# Patient Record
Sex: FEMALE | Race: OTHER | NOT HISPANIC OR LATINO | Employment: FULL TIME | URBAN - METROPOLITAN AREA
[De-identification: names, ages, dates, MRNs, and addresses within clinical notes are randomized per-mention and may not be internally consistent; named-entity substitution may affect disease eponyms.]

---

## 2022-01-13 ENCOUNTER — HISTORICAL (OUTPATIENT)
Dept: RADIOLOGY | Facility: HOSPITAL | Age: 32
End: 2022-01-13

## 2022-04-07 ENCOUNTER — HISTORICAL (OUTPATIENT)
Dept: ADMINISTRATIVE | Facility: HOSPITAL | Age: 32
End: 2022-04-07
Payer: MEDICAID

## 2022-04-23 VITALS
DIASTOLIC BLOOD PRESSURE: 85 MMHG | WEIGHT: 260.81 LBS | OXYGEN SATURATION: 100 % | HEIGHT: 61 IN | SYSTOLIC BLOOD PRESSURE: 121 MMHG | BODY MASS INDEX: 49.24 KG/M2

## 2022-05-11 ENCOUNTER — HOSPITAL ENCOUNTER (OUTPATIENT)
Dept: RADIOLOGY | Facility: HOSPITAL | Age: 32
Discharge: HOME | End: 2022-05-11
Attending: NURSE PRACTITIONER
Payer: MEDICAID

## 2022-05-11 DIAGNOSIS — M25.561 RIGHT KNEE PAIN: Primary | ICD-10-CM

## 2022-05-11 DIAGNOSIS — M25.561 RIGHT KNEE PAIN: ICD-10-CM

## 2022-05-11 PROCEDURE — 73560 X-RAY EXAM OF KNEE 1 OR 2: CPT | Mod: TC,RT

## 2022-06-08 ENCOUNTER — OFFICE VISIT (OUTPATIENT)
Dept: GYNECOLOGY | Facility: CLINIC | Age: 32
End: 2022-06-08
Payer: MEDICAID

## 2022-06-08 VITALS
OXYGEN SATURATION: 99 % | HEIGHT: 61 IN | HEART RATE: 80 BPM | DIASTOLIC BLOOD PRESSURE: 96 MMHG | TEMPERATURE: 99 F | RESPIRATION RATE: 18 BRPM | SYSTOLIC BLOOD PRESSURE: 144 MMHG | BODY MASS INDEX: 49.47 KG/M2 | WEIGHT: 262 LBS

## 2022-06-08 DIAGNOSIS — E66.01 MORBID OBESITY: ICD-10-CM

## 2022-06-08 DIAGNOSIS — E28.2 PCOS (POLYCYSTIC OVARIAN SYNDROME): ICD-10-CM

## 2022-06-08 DIAGNOSIS — Z30.09 ENCOUNTER FOR COUNSELING REGARDING CONTRACEPTION: ICD-10-CM

## 2022-06-08 DIAGNOSIS — I10 HYPERTENSION, UNSPECIFIED TYPE: ICD-10-CM

## 2022-06-08 DIAGNOSIS — N88.8 CERVICAL MASS: Primary | ICD-10-CM

## 2022-06-08 PROCEDURE — 99214 OFFICE O/P EST MOD 30 MIN: CPT | Mod: PBBFAC

## 2022-06-08 PROCEDURE — 57500 BIOPSY OF CERVIX: CPT | Mod: PBBFAC | Performed by: OBSTETRICS & GYNECOLOGY

## 2022-06-08 PROCEDURE — 88305 TISSUE EXAM BY PATHOLOGIST: CPT

## 2022-06-08 RX ORDER — MELOXICAM 15 MG/1
15 TABLET ORAL DAILY
COMMUNITY
Start: 2022-05-10

## 2022-06-08 RX ORDER — DICLOFENAC SODIUM 10 MG/G
GEL TOPICAL
COMMUNITY
Start: 2022-05-12

## 2022-06-08 RX ORDER — TRIPROLIDINE/PSEUDOEPHEDRINE 2.5MG-60MG
TABLET ORAL
COMMUNITY

## 2022-06-08 RX ORDER — CAMPHOR 0.45 %
GEL (GRAM) TOPICAL
COMMUNITY
End: 2022-06-09 | Stop reason: SDUPTHER

## 2022-06-08 RX ORDER — NORGESTIMATE AND ETHINYL ESTRADIOL 0.25-0.035
KIT ORAL
COMMUNITY
Start: 2021-12-29 | End: 2022-12-22

## 2022-06-08 RX ORDER — PROMETHAZINE HYDROCHLORIDE AND DEXTROMETHORPHAN HYDROBROMIDE 6.25; 15 MG/5ML; MG/5ML
5 SYRUP ORAL
COMMUNITY
Start: 2022-01-07

## 2022-06-08 RX ORDER — METHYLPREDNISOLONE 4 MG/1
TABLET ORAL
COMMUNITY
Start: 2022-05-23

## 2022-06-08 RX ORDER — TRIAMCINOLONE ACETONIDE 1 MG/G
CREAM TOPICAL
COMMUNITY
Start: 2022-01-24

## 2022-06-08 RX ORDER — HYDROCHLOROTHIAZIDE 12.5 MG/1
12.5 CAPSULE ORAL DAILY
COMMUNITY
Start: 2022-05-31

## 2022-06-08 NOTE — PROGRESS NOTES
[facility] GYNECOLOGY CLINIC NOTE     Devyn Oakes is a 31 y.o. G0 w/ PMH PCOS, morbid obesity, and  presenting to GYN clinic for follow-up of AUB and PCOS. Patient was seen by Dr. Browne and noted to have irregular cycles on OCPs (ortho-cyclen).  She notes that she has since had regular cycles following that appointment and she only had irregular spotting over the course of one month or so. She would like to continue on the Sprintec.     She also has had significant history of abnormal pap smears with an excisional procedure in the past. She is due for pap smear today.     TVUS results were reviewed today, overall unremarkable.    Gynecology  12/Irregular   Contraception: IUD     Past Medical History:   Diagnosis Date    Abnormal Pap smear of cervix     Hypertension       Past Surgical History:   Procedure Laterality Date    CERVICAL BIOPSY  W/ LOOP ELECTRODE EXCISION        Current Outpatient Medications   Medication Instructions    diclofenac sodium (VOLTAREN) 1 % Gel Topical (Top)    diphenhydrAMINE HCL (BENADRYL) 2 % Gel Benadryl Take No date recorded No form recorded No frequency recorded No route recorded No set duration recorded No set duration amount recorded suspended No dosage strength recorded No dosage strength units of measure recorded    hydroCHLOROthiazide (MICROZIDE) 12.5 mg, Oral, Daily    ibuprofen (ADVIL,MOTRIN) 100 mg/5 mL suspension ibuprofen Take No date recorded No form recorded No frequency recorded No route recorded No set duration recorded No set duration amount recorded suspended No dosage strength recorded No dosage strength units of measure recorded    meloxicam (MOBIC) 15 mg, Oral, Daily    methylPREDNISolone (MEDROL DOSEPACK) 4 mg tablet Medrol (Jean-Paul) Take as directed 20220523 tablets,dose pack No frequency recorded No route recorded No set duration recorded No set duration amount recorded active 4 mg    norgestimate-ethinyl estradioL (ORTHO-CYCLEN) 0.25-35 mg-mcg  "per tablet Oral    promethazine-dextromethorphan (PROMETHAZINE-DM) 6.25-15 mg/5 mL Syrp 5 mLs    triamcinolone acetonide 0.1% (KENALOG) 0.1 % cream apply to right arm TWICE A DAY FOR 10 DAYS     Social History     Tobacco Use    Smoking status: Never Smoker    Smokeless tobacco: Never Used   Substance Use Topics    Alcohol use: Yes     Comment: soically    Drug use: Never       Review of Systems  Pertinent items are noted in HPI.     Objective:     BP (!) 144/96 (BP Location: Left arm, Patient Position: Sitting, BP Method: Large (Automatic))   Pulse 80   Temp 98.7 °F (37.1 °C)   Resp 18   Ht 5' 1" (1.549 m)   Wt 118.8 kg (262 lb)   LMP 2022 (Exact Date)   SpO2 99%   BMI 49.50 kg/m²     Physical Exam:  Gen: Well-nourished, obese female. Alert, cooperative, in no acute distress. Anxious.  CV: regular rate  Chest: no increased WOB  Abdomen: Soft, non-tender, no masses, obese  Extrem: Extremities normal, atraumatic, non-tender calves.  External genitalia: Normal female genetalia without lesion, discharge or tenderness.  Speculum Exam: Vaginal vault without discharge, nonodorous, no lesions/masses seen.  Cervical os visualized as closed,. Small red mass appears to be protruding from the cervical os.   Bimanual Exam: No cervical motion tenderness.  Uterus freely mobile.  Limited due to body habitus. No adnexal masses.  Nontender exam.   Note: RN chaperone present for entirety of exam.    Assessment:       31 y.o.  here for follow-up of abnormal bleeding and pap smear.  1. Cervical mass  Specimen to Pathology Gynecology and Obstetrics    Liquid-Based Pap Smear, Screening Screening   2. Contraception counseling     3. PCOS (polycystic ovarian syndrome)     4. Hypertension     5. Morbid obesity              Procedures:   Date of procedure:  2022    Procedure(s):    Cervical mass biopsy    Indication: Cervical mass    Procedure technique:     Devyn Oakes was counseled on risks, " benefits, alternatives to her procedure today. A consent form was reviewed and signed. All questions were answered to her satisfaction.    After discussing and obtaining informed consent, a time out was performed confirming her identity, surgical site, planned procedure and that all necessary equipment was available. She was positioned on the exam table in dorsal lithotomy position.   Speculum was placed with excellent visualization of the cervix. Cervix swabbed with betadine x 3. Ringed forceps used to grasp and tease cervical mass and removed. Specimen labeled and sent to pathology.  All instruments removed. Patient tolerated the procedure well.  Minimal EBL.  No complications.    Plan:     Problem List Items Addressed This Visit        Cardiac/Vascular    Hypertension     -- Continue HCTZ              Renal/    Cervical mass - Primary     -- Pap smear today and cervical mass biopsy today           Relevant Orders    Specimen to Pathology Gynecology and Obstetrics (Completed)    Liquid-Based Pap Smear, Screening Screening    Contraception counseling       Endocrine    PCOS (polycystic ovarian syndrome)     -- She is happy with her contraception at this time. Will continue Sprintec.  -- Discussed that PCOS causes increased insulin resistance and increases unopposed estrogen that can lead to endometrial hyperplasia. It will be important to continue to exercise, eat healthy and attempt to lose weight. She also has an increased risk of annovulatory cycles, so continuing the Sprintec will be important in order to regular menstrual cycles.           Morbid obesity     Discussed the importance of weight loss with conservative measures like improved exercise and nutrition but also discussed bariatrics.                 Return to clinic 1 year for annual with NNP    Discussed patient and plan with Provost Diya Riggs MD PGY-3  Obstetrics and Gynecology

## 2022-06-09 RX ORDER — CAMPHOR 0.45 %
GEL (GRAM) TOPICAL
Qty: 8 ML | Refills: 0 | Status: SHIPPED | OUTPATIENT
Start: 2022-06-09

## 2022-06-10 LAB
ESTROGEN SERPL-MCNC: NORMAL PG/ML
INSULIN SERPL-ACNC: NORMAL U[IU]/ML
LAB AP CLINICAL INFORMATION: NORMAL
LAB AP GROSS DESCRIPTION: NORMAL
LAB AP REPORT FOOTNOTES: NORMAL
T3RU NFR SERPL: NORMAL %

## 2022-06-12 PROBLEM — L68.0 HIRSUTISM: Status: ACTIVE | Noted: 2022-06-12

## 2022-06-12 PROBLEM — E28.2 PCOS (POLYCYSTIC OVARIAN SYNDROME): Status: ACTIVE | Noted: 2022-06-12

## 2022-06-12 PROBLEM — Z30.09 ENCOUNTER FOR COUNSELING REGARDING CONTRACEPTION: Status: ACTIVE | Noted: 2022-06-12

## 2022-06-12 PROBLEM — N88.8 CERVICAL MASS: Status: ACTIVE | Noted: 2022-06-12

## 2022-06-12 PROBLEM — I10 HYPERTENSION: Status: ACTIVE | Noted: 2022-06-12

## 2022-06-12 PROBLEM — E66.01 MORBID OBESITY: Status: ACTIVE | Noted: 2022-06-12

## 2022-06-12 NOTE — ASSESSMENT & PLAN NOTE
Discussed the importance of weight loss with conservative measures like improved exercise and nutrition but also discussed bariatrics.

## 2022-06-12 NOTE — ASSESSMENT & PLAN NOTE
-- She is happy with her contraception at this time. Will continue Sprintec.  -- Discussed that PCOS causes increased insulin resistance and increases unopposed estrogen that can lead to endometrial hyperplasia. It will be important to continue to exercise, eat healthy and attempt to lose weight. She also has an increased risk of annovulatory cycles, so continuing the Sprintec will be important in order to regular menstrual cycles.

## 2022-06-13 ENCOUNTER — TELEPHONE (OUTPATIENT)
Dept: GYNECOLOGY | Facility: CLINIC | Age: 32
End: 2022-06-13
Payer: MEDICAID

## 2022-06-13 NOTE — TELEPHONE ENCOUNTER
Called patient to notify Dr Riggs sent in benedryl gel refill, patient asked about PCOS med for facial hair.  Spoke to Dr Riggs and she will send in spironolactone.  Patient notified.

## 2022-06-15 LAB
INSULIN SERPL-ACNC: NORMAL U[IU]/ML
LAB AP BETHESDA CATEGORY: NORMAL
LAB AP GYN ADDITIONAL FINDINGS: NORMAL
LAB AP GYN MOLECULAR TESTING: NORMAL
LAB AP LMP DATE: NORMAL
LAB AP OCHS PAP SPECIMEN ADEQUACY: NORMAL
LAB AP OHS PAP INTERPRETATION: NORMAL
LAB AP PAP DISCLAIMER COMMENTS: NORMAL

## 2022-07-06 ENCOUNTER — TELEPHONE (OUTPATIENT)
Dept: GYNECOLOGY | Facility: CLINIC | Age: 32
End: 2022-07-06
Payer: MEDICAID

## 2022-07-06 NOTE — TELEPHONE ENCOUNTER
----- Message from Eun Madrigal sent at 7/6/2022 10:19 AM CDT -----  Regarding: Prescription Refill  Patient is calling in regards to some medicine for PCOS.  A cream and pill form.  [address]  Please and Thank You.

## 2022-07-07 ENCOUNTER — TELEPHONE (OUTPATIENT)
Dept: GYNECOLOGY | Facility: CLINIC | Age: 32
End: 2022-07-07
Payer: MEDICAID

## 2022-07-07 NOTE — TELEPHONE ENCOUNTER
Patient returned my call and I called her back.  Had to [facility] again, she did not answer

## 2022-07-11 ENCOUNTER — TELEPHONE (OUTPATIENT)
Dept: GYNECOLOGY | Facility: CLINIC | Age: 32
End: 2022-07-11
Payer: MEDICAID

## 2022-07-11 NOTE — TELEPHONE ENCOUNTER
----- Message from Gaby Shabazz MA sent at 7/11/2022  9:38 AM CDT -----  Regarding: Rx not received  The patient was seen on 07/06/2022. She was prescribed medication and did not receive them. Her pharmacy has been updated. Please contact the patient/send Rx to [[facility].    Thank you.

## 2022-07-12 ENCOUNTER — TELEPHONE (OUTPATIENT)
Dept: GYNECOLOGY | Facility: CLINIC | Age: 32
End: 2022-07-12
Payer: MEDICAID

## 2022-07-12 DIAGNOSIS — E28.2 PCOS (POLYCYSTIC OVARIAN SYNDROME): Primary | ICD-10-CM

## 2022-07-12 DIAGNOSIS — L68.0 HIRSUTISM: ICD-10-CM

## 2022-07-12 RX ORDER — SPIRONOLACTONE 25 MG/1
25 TABLET ORAL DAILY
Qty: 30 TABLET | Refills: 11 | Status: SHIPPED | OUTPATIENT
Start: 2022-07-12 | End: 2023-07-12

## 2022-07-12 NOTE — PROGRESS NOTES
Prescription sent for spironolactone 25mg daily for management of hirsutism secondary to PCOS.     Mitzi Domingo MD   [employer] [facility] PGY4

## 2022-07-12 NOTE — TELEPHONE ENCOUNTER
Patient is referring to spironolactone.  I have found the cream she is referring to.  If you send the directions I can call it in.